# Patient Record
Sex: FEMALE | Race: WHITE | URBAN - METROPOLITAN AREA
[De-identification: names, ages, dates, MRNs, and addresses within clinical notes are randomized per-mention and may not be internally consistent; named-entity substitution may affect disease eponyms.]

---

## 2020-09-18 ENCOUNTER — OFFICE VISIT (OUTPATIENT)
Dept: FAMILY MEDICINE CLINIC | Age: 45
End: 2020-09-18
Payer: COMMERCIAL

## 2020-09-18 VITALS
RESPIRATION RATE: 10 BRPM | WEIGHT: 275 LBS | HEIGHT: 67 IN | HEART RATE: 60 BPM | DIASTOLIC BLOOD PRESSURE: 72 MMHG | SYSTOLIC BLOOD PRESSURE: 124 MMHG | BODY MASS INDEX: 43.16 KG/M2 | OXYGEN SATURATION: 98 %

## 2020-09-18 DIAGNOSIS — Z00.00 WELL ADULT EXAM: ICD-10-CM

## 2020-09-18 DIAGNOSIS — E66.01 MORBID OBESITY WITH BMI OF 40.0-44.9, ADULT (HCC): ICD-10-CM

## 2020-09-18 PROBLEM — Z82.79 FAMILY HISTORY OF BICUSPID AORTIC VALVE: Status: ACTIVE | Noted: 2020-09-18

## 2020-09-18 PROBLEM — R01.1 HEART MURMUR: Status: ACTIVE | Noted: 2020-09-18

## 2020-09-18 PROBLEM — K64.4 EXTERNAL HEMORRHOID: Status: ACTIVE | Noted: 2020-09-18

## 2020-09-18 PROCEDURE — 99386 PREV VISIT NEW AGE 40-64: CPT | Performed by: NURSE PRACTITIONER

## 2020-09-18 ASSESSMENT — PATIENT HEALTH QUESTIONNAIRE - PHQ9
1. LITTLE INTEREST OR PLEASURE IN DOING THINGS: 0
2. FEELING DOWN, DEPRESSED OR HOPELESS: 0
SUM OF ALL RESPONSES TO PHQ9 QUESTIONS 1 & 2: 0
SUM OF ALL RESPONSES TO PHQ QUESTIONS 1-9: 0
SUM OF ALL RESPONSES TO PHQ QUESTIONS 1-9: 0

## 2020-09-18 ASSESSMENT — ENCOUNTER SYMPTOMS
ABDOMINAL PAIN: 0
WHEEZING: 0
VOMITING: 0
CONSTIPATION: 0
NAUSEA: 0
BACK PAIN: 1
SHORTNESS OF BREATH: 0
DIARRHEA: 0
BLOOD IN STOOL: 0
COUGH: 0

## 2020-09-18 NOTE — PROGRESS NOTES
2020    Macie Garsia (:  1975) is a 39 y.o. female, here for evaluation of the following medical concerns:  Chief Complaint   Patient presents with    New Patient       HPI  Patient is here for a new patient visit. Previous PCP was Dr. Garry Quintero with [address]. Has not seen in multiple years. History of anemia with pregnancy. Denies issues without pregnancy. Has had MRSA skin infection in past. Has not had a recent issue. Had COVID-19 in July. Tested at urgent care. Reports that she really did not have symptoms. Denies chronic cough, shortness of breath, lost of taste or smell. History of lumbar radiculopathy and pain in left thigh. Denies muscle weakness. Has seen orthopedics and told to do exercises. GYN- Dr. Yared Mueller. Denies abnormal pap smear. Has not had pap smear recently. Does do self breast exams monthly. Has never had a mammogram.     Exercise- nothing routine   Diet- nothing routine     Will get a discharge out of belly button that has a odor. Will mostly get clear liquid but sometimes milky. Has tried to dry area. Positive for itching. Denies redness and swelling. Does not currently have the discharge. Has itching under arms for the past couple of years. Tried new deodorant without improvement in symptoms. Denies sores. Has \"polyps\" in rectum. Tries to keep area dry. Denies regular bleeding. If irritated would have blood on tissue. Denies rectal pain, constipation, diarrhea, abd pain. Review of Systems   Constitutional: Negative for activity change, fatigue and fever. Respiratory: Negative for cough, shortness of breath and wheezing. Cardiovascular: Negative for chest pain and palpitations. Gastrointestinal: Negative for abdominal pain, blood in stool, constipation, diarrhea, nausea and vomiting. Musculoskeletal: Positive for back pain. Neurological: Negative for dizziness and headaches.        Prior to Visit Medications    Not on File No Known Allergies    Past Medical History:   Diagnosis Date    Anemia     with pregnancy     delivery delivered 2013    COVID-19 2020    History of hernia repair     Ventral Hernia Repair    Hypertension     PIH 2004    Miscarriage     MRSA infection     skin infection        Past Surgical History:   Procedure Laterality Date     SECTION  04, 06, 8/28/10, 13       Social History     Socioeconomic History    Marital status:      Spouse name: Angel Godwin Number of children: 3    Years of education: Not on file    Highest education level: Not on file   Occupational History    Not on file   Social Needs    Financial resource strain: Not on file    Food insecurity     Worry: Not on file     Inability: Not on file   [employer] needs     Medical: Not on file     Non-medical: Not on file   Tobacco Use    Smoking status: Never Smoker    Smokeless tobacco: Never Used   Substance and Sexual Activity    Alcohol use: Yes     Comment: 1 drink or less per year     Drug use: No    Sexual activity: Yes     Partners: Male     Comment: Josh Fails    Lifestyle    Physical activity     Days per week: Not on file     Minutes per session: Not on file    Stress: Not on file   Relationships    Social connections     Talks on phone: Not on file     Gets together: Not on file     Attends Restorationist service: Not on file     Active member of club or organization: Not on file     Attends meetings of clubs or organizations: Not on file     Relationship status: Not on file    Intimate partner violence     Fear of current or ex partner: Not on file     Emotionally abused: Not on file     Physically abused: Not on file     Forced sexual activity: Not on file   Other Topics Concern    Not on file   Social History Narrative    Not on file        Family History   Problem Relation Age of Onset    Cancer Maternal Grandfather         unknown type     Arthritis Maternal Grandfather     Learning Disabilities Sister     Diabetes Maternal Aunt     Diabetes Maternal Uncle     Hearing Loss Paternal Uncle     Arthritis Maternal Grandmother     Diabetes Maternal Grandmother     Arthritis Paternal Grandmother     Arthritis Paternal Grandfather     Lung Cancer Mother 64         age 56    Learning Disabilities Daughter     Other Daughter         bicuspid aortic valve        Vitals:    20 1414   BP: 124/72   Site: Right Upper Arm   Position: Sitting   Cuff Size: Large Adult   Pulse: 60   Resp: 10   SpO2: 98%   Weight: 275 lb (124.7 kg)   Height: 5' 7\" (1.702 m)     Estimated body mass index is 43.07 kg/m² as calculated from the following:    Height as of this encounter: 5' 7\" (1.702 m). Weight as of this encounter: 275 lb (124.7 kg). Physical Exam  Vitals signs and nursing note reviewed. Constitutional:       General: She is not in acute distress. Appearance: She is well-developed. HENT:      Head: Normocephalic and atraumatic. Right Ear: Tympanic membrane, ear canal and external ear normal.      Left Ear: Tympanic membrane, ear canal and external ear normal.      Nose: Nose normal.      Mouth/Throat:      Mouth: Mucous membranes are moist.   Eyes:      Extraocular Movements: Extraocular movements intact. Conjunctiva/sclera: Conjunctivae normal.   Neck:      Musculoskeletal: Neck supple. Cardiovascular:      Rate and Rhythm: Normal rate and regular rhythm. Heart sounds: Murmur present. Systolic murmur present with a grade of 2/6. Pulmonary:      Effort: Pulmonary effort is normal. No respiratory distress. Breath sounds: Normal breath sounds. Abdominal:      Palpations: Abdomen is soft. Tenderness: There is no abdominal tenderness. Comments: Umbilical area without tenderness or redness. No drainage noted. Genitourinary:     Rectum: Guaiac result negative. External hemorrhoid present. No tenderness.       Comments: Small external hemorrhoid noted at 6 o'clock. Musculoskeletal:      Right lower leg: No edema. Left lower leg: No edema. Skin:     General: Skin is warm and dry. Comments: Suresh axilla without erythema. Neurological:      Mental Status: She is alert and oriented to person, place, and time. Psychiatric:         Behavior: Behavior normal.         Thought Content: Thought content normal.         Judgment: Judgment normal.         ASSESSMENT/PLAN:  1. Well adult exam  - Comprehensive Metabolic Panel, Fasting; Future  - Lipid, Fasting; Future  - CBC Auto Differential; Future  - TSH with Reflex; Future  Healthy lifestyles reviewed: diet, aerobic exercise, sunscreen, vision and dental exams. Advised to f/u with GYN Dr. Kalyn Phillip for pap smear. Should obtain flu vaccine in the fall. 2. Morbid obesity with BMI of 40.0-44.9, adult (HCC)  - Lipid, Fasting; Future  - TSH with Reflex; Future  Diet, aerobic exercise, and weight loss discussed and needed. 3. External hemorrhoid  Advised to monitor for increased symptoms. Advised to avoid constipation- staying hydrated with water and high fiber diet. 4. Heart murmur  Noted on exam today   - ECHO 2D WO Color Doppler Complete; Future    5. Family history of bicuspid aortic valve  Daughter with history of bicuspid aortic valve. - ECHO 2D WO Color Doppler Complete; Future    6. Breast cancer screening  - [facility] DIGITAL SCREEN W OR WO CAD BILATERAL; Future      Return in about 1 year (around 9/18/2021), or if symptoms worsen or fail to improve, for physical.    An  electronic signature was used to authenticate this note.     --SHANEKA Wills - CNP on 9/18/2020 at 2:36 PM

## 2020-09-19 LAB
A/G RATIO: 2 (ref 1.1–2.2)
ALBUMIN SERPL-MCNC: 4.5 G/DL (ref 3.4–5)
ALP BLD-CCNC: 108 U/L (ref 40–129)
ALT SERPL-CCNC: 20 U/L (ref 10–40)
ANION GAP SERPL CALCULATED.3IONS-SCNC: 11 MMOL/L (ref 3–16)
AST SERPL-CCNC: 13 U/L (ref 15–37)
BASOPHILS ABSOLUTE: 0 K/UL (ref 0–0.2)
BASOPHILS RELATIVE PERCENT: 0.5 %
BILIRUB SERPL-MCNC: 1.8 MG/DL (ref 0–1)
BUN BLDV-MCNC: 12 MG/DL (ref 7–20)
CALCIUM SERPL-MCNC: 9.1 MG/DL (ref 8.3–10.6)
CHLORIDE BLD-SCNC: 102 MMOL/L (ref 99–110)
CHOLESTEROL, FASTING: 183 MG/DL (ref 0–199)
CO2: 26 MMOL/L (ref 21–32)
CREAT SERPL-MCNC: 0.6 MG/DL (ref 0.6–1.1)
EOSINOPHILS ABSOLUTE: 0.1 K/UL (ref 0–0.6)
EOSINOPHILS RELATIVE PERCENT: 1.5 %
GFR AFRICAN AMERICAN: >60
GFR NON-AFRICAN AMERICAN: >60
GLOBULIN: 2.3 G/DL
GLUCOSE FASTING: 90 MG/DL (ref 70–99)
HCT VFR BLD CALC: 37.1 % (ref 36–48)
HDLC SERPL-MCNC: 46 MG/DL (ref 40–60)
HEMOGLOBIN: 12.8 G/DL (ref 12–16)
LDL CHOLESTEROL CALCULATED: 125 MG/DL
LYMPHOCYTES ABSOLUTE: 1.4 K/UL (ref 1–5.1)
LYMPHOCYTES RELATIVE PERCENT: 18.1 %
MCH RBC QN AUTO: 29.6 PG (ref 26–34)
MCHC RBC AUTO-ENTMCNC: 34.5 G/DL (ref 31–36)
MCV RBC AUTO: 85.8 FL (ref 80–100)
MONOCYTES ABSOLUTE: 0.4 K/UL (ref 0–1.3)
MONOCYTES RELATIVE PERCENT: 4.8 %
NEUTROPHILS ABSOLUTE: 5.6 K/UL (ref 1.7–7.7)
NEUTROPHILS RELATIVE PERCENT: 75.1 %
PDW BLD-RTO: 13.4 % (ref 12.4–15.4)
PLATELET # BLD: 300 K/UL (ref 135–450)
PMV BLD AUTO: 7.9 FL (ref 5–10.5)
POTASSIUM SERPL-SCNC: 4.1 MMOL/L (ref 3.5–5.1)
RBC # BLD: 4.32 M/UL (ref 4–5.2)
SODIUM BLD-SCNC: 139 MMOL/L (ref 136–145)
TOTAL PROTEIN: 6.8 G/DL (ref 6.4–8.2)
TRIGLYCERIDE, FASTING: 58 MG/DL (ref 0–150)
TSH REFLEX: 2.16 UIU/ML (ref 0.27–4.2)
VLDLC SERPL CALC-MCNC: 12 MG/DL
WBC # BLD: 7.5 K/UL (ref 4–11)

## 2021-07-02 LAB
HPV COMMENT: NORMAL
HPV TYPE 16: NOT DETECTED
HPV TYPE 18: NOT DETECTED
HPVOH (OTHER TYPES): NOT DETECTED

## 2022-01-24 ENCOUNTER — OFFICE VISIT (OUTPATIENT)
Dept: FAMILY MEDICINE CLINIC | Age: 47
End: 2022-01-24
Payer: MEDICARE

## 2022-01-24 VITALS
DIASTOLIC BLOOD PRESSURE: 78 MMHG | OXYGEN SATURATION: 98 % | BODY MASS INDEX: 43.63 KG/M2 | WEIGHT: 278 LBS | HEIGHT: 67 IN | SYSTOLIC BLOOD PRESSURE: 126 MMHG | HEART RATE: 65 BPM | RESPIRATION RATE: 18 BRPM

## 2022-01-24 DIAGNOSIS — L30.9 DERMATITIS: Primary | ICD-10-CM

## 2022-01-24 DIAGNOSIS — L30.9 ECZEMA, UNSPECIFIED TYPE: ICD-10-CM

## 2022-01-24 PROCEDURE — 99213 OFFICE O/P EST LOW 20 MIN: CPT | Performed by: NURSE PRACTITIONER

## 2022-01-24 ASSESSMENT — PATIENT HEALTH QUESTIONNAIRE - PHQ9
SUM OF ALL RESPONSES TO PHQ QUESTIONS 1-9: 0
SUM OF ALL RESPONSES TO PHQ QUESTIONS 1-9: 0
1. LITTLE INTEREST OR PLEASURE IN DOING THINGS: 0
SUM OF ALL RESPONSES TO PHQ QUESTIONS 1-9: 0
2. FEELING DOWN, DEPRESSED OR HOPELESS: 0
SUM OF ALL RESPONSES TO PHQ9 QUESTIONS 1 & 2: 0
SUM OF ALL RESPONSES TO PHQ QUESTIONS 1-9: 0

## 2022-01-24 NOTE — PROGRESS NOTES
1/24/2022    This is a 42 y.o. female   Chief Complaint   Patient presents with    Other     Pt has been having really bad abdominal itchy, pt said that itchy is very bad. Pt said that her belly button has been having dicharge as well. Pt said the dicharge is more liquid, then thick but has an oder. Clement Fly HPI  Patient reports that she has recurrent itching and drainage from her belly button. Current episode has lasted a couple of weeks. Reports that the itching is significant. Will have some crusting around belly button. Positive for an odor. Has tried hydrocortisone and monistat without much improvement. Tries to keep area dry. In the past it will resolve on it's own after a couple months. Reports that symptoms will reoccur at least every couple of months. Patient Active Problem List   Diagnosis    Morbid obesity with BMI of 40.0-44.9, adult ([facility] [address]    External hemorrhoid    Heart murmur    Family history of bicuspid aortic valve          No current outpatient medications on file. No current facility-administered medications for this visit. No Known Allergies    Review of Systems   Constitutional: Negative for fever. Skin: Positive for rash. Vitals:    01/24/22 0740   BP: 126/78   Site: Right Upper Arm   Position: Sitting   Cuff Size: Large Adult   Pulse: 65   Resp: 18   SpO2: 98%   Weight: 278 lb (126.1 kg)   Height: 5' 7\" (1.702 m)       Body mass index is 43.54 kg/m². Wt Readings from Last 3 Encounters:   01/24/22 278 lb (126.1 kg)   09/18/20 275 lb (124.7 kg)   01/25/10 244 lb (110.7 kg)       BP Readings from Last 3 Encounters:   01/24/22 126/78   09/18/20 124/72       Physical Exam  Vitals and nursing note reviewed. Constitutional:       Appearance: She is well-developed. HENT:      Head: Normocephalic and atraumatic. Pulmonary:      Effort: Pulmonary effort is normal.   Skin:     General: Skin is warm and dry. Comments: Slight erythema on external umbilicus.  No drainage noted and no abnormal odor noted. Waist line with scattered erythema. Few healing scabs. Neurological:      Mental Status: She is alert and oriented to person, place, and time. Psychiatric:         Behavior: Behavior normal.         Thought Content: Thought content normal.         Judgment: Judgment normal.         Assessmentand Plan  Ramirez Siegel was seen today. Diagnoses and all orders for this visit:    Dermatitis: umbilicus   -     triamcinolone (KENALOG) 0.1 % ointment; Apply topically 2 times daily for 7 days  When not in a flare should use aquaphor on outside of umbilicus qhs to help prevent recurrence. Patient is to call if symptoms worsen or fail to improve within the week. Eczema  Advised to use hydrating lotion such as Eucerin to skin daily        Return if symptoms worsen or fail to improve.

## 2023-12-27 LAB — MAMMOGRAPHY, EXTERNAL: NORMAL

## 2024-06-09 ENCOUNTER — APPOINTMENT (OUTPATIENT)
Dept: GENERAL RADIOLOGY | Age: 49
End: 2024-06-09
Payer: COMMERCIAL

## 2024-06-09 ENCOUNTER — HOSPITAL ENCOUNTER (EMERGENCY)
Age: 49
Discharge: HOME | End: 2024-06-09
Payer: COMMERCIAL

## 2024-06-09 VITALS
SYSTOLIC BLOOD PRESSURE: 148 MMHG | HEART RATE: 77 BPM | DIASTOLIC BLOOD PRESSURE: 100 MMHG | WEIGHT: 293 LBS | OXYGEN SATURATION: 100 % | BODY MASS INDEX: 46.2 KG/M2 | TEMPERATURE: 98.6 F | RESPIRATION RATE: 12 BRPM

## 2024-06-09 DIAGNOSIS — S63.289A DISLOCATION OF PROXIMAL INTERPHALANGEAL JOINT OF FINGER, INITIAL ENCOUNTER: Primary | ICD-10-CM

## 2024-06-09 PROCEDURE — 73130 X-RAY EXAM OF HAND: CPT

## 2024-06-09 PROCEDURE — 73140 X-RAY EXAM OF FINGER(S): CPT

## 2024-06-09 PROCEDURE — 6360000002 HC RX W HCPCS: Performed by: NURSE PRACTITIONER

## 2024-06-09 PROCEDURE — 26770 TREAT FINGER DISLOCATION: CPT

## 2024-06-09 PROCEDURE — 2500000003 HC RX 250 WO HCPCS: Performed by: NURSE PRACTITIONER

## 2024-06-09 PROCEDURE — 99283 EMERGENCY DEPT VISIT LOW MDM: CPT

## 2024-06-09 RX ORDER — BUPIVACAINE HYDROCHLORIDE 5 MG/ML
5 INJECTION, SOLUTION EPIDURAL; INTRACAUDAL ONCE
Status: DISCONTINUED | OUTPATIENT
Start: 2024-06-09 | End: 2024-06-10 | Stop reason: HOSPADM

## 2024-06-09 ASSESSMENT — PAIN DESCRIPTION - LOCATION: LOCATION: FINGER (COMMENT WHICH ONE)

## 2024-06-09 ASSESSMENT — PAIN - FUNCTIONAL ASSESSMENT
PAIN_FUNCTIONAL_ASSESSMENT: 0-10
PAIN_FUNCTIONAL_ASSESSMENT: ACTIVITIES ARE NOT PREVENTED

## 2024-06-09 ASSESSMENT — PAIN SCALES - GENERAL
PAINLEVEL_OUTOF10: 6
PAINLEVEL_OUTOF10: 1

## 2024-06-09 ASSESSMENT — PAIN DESCRIPTION - PAIN TYPE: TYPE: ACUTE PAIN

## 2024-06-09 ASSESSMENT — PAIN DESCRIPTION - ORIENTATION: ORIENTATION: RIGHT

## 2024-06-09 ASSESSMENT — PAIN DESCRIPTION - DESCRIPTORS: DESCRIPTORS: ACHING

## 2024-06-10 NOTE — DISCHARGE INSTRUCTIONS
Wear the splint for the next 24 to 48 hours.  Exercise the finger joints.  Ice 20 to 30 minutes 3-4 times a day  Pain management: Tylenol/acetaminophen: 650 mg every 4-6 hours as needed for pain  And Motrin/ibuprofen: 400 mg every 6-8 hours as needed for pain  You may need to plan on using these medications for the next 2 to 3 days

## 2024-06-10 NOTE — ED PROVIDER NOTES
[facility] EMERGENCY DEPARTMENT  eMERGENCY dEPARTMENT eNCOUnter    Pt Name: @@  MRN: [id number]  Birthdate3/1975  Date of evaluation: 2024  Provider: SHANEKA Juarez CNP      Independently seen and evaluated by the advanced practice provider  CHIEF COMPLAINT       Chief Complaint   Patient presents with    Finger Injury     Pt with possible dislocated finger on right hand from playing volleyball this evening         HISTORY OF PRESENT ILLNESS  (Location/Symptom, Timing/Onset, Context/Setting, Quality, Duration, Modifying Factors, Severity.)   Vannesa Carlson is a 49 y.o. female who presents to the emergencydepartment PMHx reviewed and listed below    Lives at home with spouse  CODE STATUS full  Social determinants: None identified    HPI provided by the patient    Patient presents to the emergency department with a right fifth finger PIP dislocation deformity.  Occurred while playing volleyball this evening.  Believes that she and her son collided and she jammed her finger as they were going for the ball at the same time.    No prior injury.  Did not medicate for pain prior to coming in.  Denies any other injuries.      Nursing Notes were reviewed and I agree.    REVIEW OF SYSTEMS    (2-9 systems for level 4, 10 or more for level 5)     Review of Systems   Musculoskeletal:         As stated in HPI   All other systems reviewed and are negative.      Except as noted above the remainder of the review of systems was reviewed and negative.       PAST MEDICAL HISTORY         Diagnosis Date    Anemia     with pregnancy     delivery delivered 2013    COVID-19 2020    History of hernia repair     Ventral Hernia Repair    Hypertension     PIH 2004    Miscarriage     MRSA infection     skin infection        SURGICAL HISTORY           Procedure Laterality Date     SECTION  04, 06, 8/28/10, 13       CURRENT MEDICATIONS       Previous Medications    No

## 2025-04-11 NOTE — PROGRESS NOTES
Patient's HM shows they are overdue for Mammogram.  Care Everywhere and  files searched.   updated with 12/27/23 mammogram.

## 2025-08-29 ENCOUNTER — OFFICE VISIT (OUTPATIENT)
Dept: ORTHOPEDIC SURGERY | Age: 50
End: 2025-08-29

## 2025-08-29 VITALS — WEIGHT: 293 LBS | HEIGHT: 67 IN | BODY MASS INDEX: 45.99 KG/M2

## 2025-08-29 DIAGNOSIS — M75.21 BICEPS TENDINITIS OF RIGHT UPPER EXTREMITY: ICD-10-CM

## 2025-08-29 DIAGNOSIS — R52 PAIN: Primary | ICD-10-CM

## 2025-08-29 RX ORDER — TRIAMCINOLONE ACETONIDE 40 MG/ML
80 INJECTION, SUSPENSION INTRA-ARTICULAR; INTRAMUSCULAR ONCE
Status: COMPLETED | OUTPATIENT
Start: 2025-08-29 | End: 2025-08-29

## 2025-08-29 RX ORDER — BUPIVACAINE HYDROCHLORIDE 2.5 MG/ML
4 INJECTION, SOLUTION INFILTRATION; PERINEURAL ONCE
Status: COMPLETED | OUTPATIENT
Start: 2025-08-29 | End: 2025-08-29

## 2025-08-29 RX ORDER — MELOXICAM 15 MG/1
15 TABLET ORAL DAILY
Qty: 90 TABLET | Refills: 0 | Status: SHIPPED | OUTPATIENT
Start: 2025-08-29

## 2025-08-29 RX ORDER — LIDOCAINE HYDROCHLORIDE 10 MG/ML
4 INJECTION, SOLUTION INFILTRATION; PERINEURAL ONCE
Status: COMPLETED | OUTPATIENT
Start: 2025-08-29 | End: 2025-08-29

## 2025-08-29 RX ADMIN — BUPIVACAINE HYDROCHLORIDE 10 MG: 2.5 INJECTION, SOLUTION INFILTRATION; PERINEURAL at 11:29

## 2025-08-29 RX ADMIN — LIDOCAINE HYDROCHLORIDE 4 ML: 10 INJECTION, SOLUTION INFILTRATION; PERINEURAL at 11:29

## 2025-08-29 RX ADMIN — TRIAMCINOLONE ACETONIDE 80 MG: 40 INJECTION, SUSPENSION INTRA-ARTICULAR; INTRAMUSCULAR at 11:30
